# Patient Record
Sex: MALE | HISPANIC OR LATINO | Employment: FULL TIME | URBAN - METROPOLITAN AREA
[De-identification: names, ages, dates, MRNs, and addresses within clinical notes are randomized per-mention and may not be internally consistent; named-entity substitution may affect disease eponyms.]

---

## 2017-04-25 ENCOUNTER — TRANSCRIBE ORDERS (OUTPATIENT)
Dept: URGENT CARE | Facility: CLINIC | Age: 52
End: 2017-04-25

## 2017-04-25 DIAGNOSIS — S33.6XXA SACROILIAC (LIGAMENT) SPRAIN, INITIAL ENCOUNTER: ICD-10-CM

## 2017-04-25 DIAGNOSIS — S76.011A STRAIN OF HIP, RIGHT, INITIAL ENCOUNTER: Primary | ICD-10-CM

## 2017-04-27 ENCOUNTER — TREATMENT (OUTPATIENT)
Dept: PHYSICAL THERAPY | Facility: CLINIC | Age: 52
End: 2017-04-27

## 2017-04-27 DIAGNOSIS — S76.011D HIP STRAIN, RIGHT, SUBSEQUENT ENCOUNTER: Primary | ICD-10-CM

## 2017-04-27 PROCEDURE — 97110 THERAPEUTIC EXERCISES: CPT | Performed by: PHYSICAL THERAPIST

## 2017-04-27 PROCEDURE — 97001 PR PHYS THERAPY EVALUATION: CPT | Performed by: PHYSICAL THERAPIST

## 2017-04-27 PROCEDURE — 97014 ELECTRIC STIMULATION THERAPY: CPT | Performed by: PHYSICAL THERAPIST

## 2017-04-27 NOTE — PROGRESS NOTES
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    Pain  Current pain ratin  Location: right hip  Relieving factors: ice and medications  Aggravating factors: movement             Objective     Observations     Additional Observation Details  Antalgic gait pattern    Tenderness     Additional Tenderness Details  TTP to right greater trochanter and glut region.    Active Range of Motion     Right Hip   Flexion: 60 degrees   Abduction: 65 degrees   Adduction: WFL    Strength/Myotome Testing     Right Hip   Planes of Motion   Flexion: 4  Abduction: 5  Adduction: 5    Additional Strength Details  Right Knee Extension 4-/5    Tests     Right Hip   Positive LIAT.   SLR: Positive.          Assessment & Plan     Assessment  Impairments: abnormal gait, abnormal or restricted ROM, activity intolerance, impaired physical strength, lacks appropriate home exercise program and pain with function  Assessment details: Patient presents with c/o pain, TTP, limited AROM, decreased strength, positive special testing and an antalgic gait pattern which is limiting his ability to perform full job duties.   Barriers to therapy: language  Prognosis details: STG's to be met by 2 weeks  1)  Independent with HEP  2)  Decrease pain by 50% or more  3)  AROM WNL for right hip  4)  Normal gait pattern    LTG's to be met by 4 weeks  1)  Independent with HEP progression  2)  Decrease pain by 75% or more  3)  Increase strength for right hip and knee to 4+/5  4)  Negative special testing  5)  No TTP to right hip region  6)  Patient to lift floor to waist up to 50 lbs      Plan  Therapy options: will be seen for skilled physical therapy services  Planned modality interventions: ultrasound, iontophoresis, TENS, cryotherapy and thermotherapy  (hydrocollator packs)  Planned therapy interventions: strengthening, stretching, therapeutic activities, home exercise program and gait training  Frequency: 3x week  Duration in weeks: 8  Treatment plan discussed with: patient        Manual Therapy:    0     mins  09602;  Therapeutic Exercise:    14     mins  12152;     Neuromuscular Shantanu:    0    mins  38829;    Therapeutic Activity:     0     mins  21280;     Gait Trainin     mins  19217;     Ultrasound:     0     mins  36565;    Work Hardening           0      mins 61329  Iontophoresis               0   mins 16233    Timed Treatment:   14   mins   Total Treatment:     52   mins    PT SIGNATURE: Zuhair Pereyra, PT   DATE TREATMENT INITIATED: 2017    Initial Certification  Certification Period: 2017  I certify that the therapy services are furnished while this patient is under my care.  The services outlined above are required by this patient, and will be reviewed every 90 days.     PHYSICIAN: OTIS Evangelista      DATE:     Please sign and return via fax to [phone].. Thank you, [facility].

## 2017-04-28 ENCOUNTER — TREATMENT (OUTPATIENT)
Dept: PHYSICAL THERAPY | Facility: CLINIC | Age: 52
End: 2017-04-28

## 2017-04-28 DIAGNOSIS — S76.011D HIP STRAIN, RIGHT, SUBSEQUENT ENCOUNTER: Primary | ICD-10-CM

## 2017-04-28 PROCEDURE — 97014 ELECTRIC STIMULATION THERAPY: CPT | Performed by: PHYSICAL THERAPIST

## 2017-04-28 PROCEDURE — 97110 THERAPEUTIC EXERCISES: CPT | Performed by: PHYSICAL THERAPIST

## 2017-04-28 NOTE — PROGRESS NOTES
Physical Therapy Daily Progress Note    Time In 115  Time Out 159    Brandon Schwab reports: that the hip feels the same.    Subjective     Objective   See Exercise, Manual, and Modality Logs for complete treatment.       Assessment/Plan  No significant change thus far with PT, but this is only his second visit.  Patient performed the exercise routine without significant c/o pain in the right hip.                    Manual Therapy:    0     mins  60409;  Therapeutic Exercise:    26     mins  52648;     Neuromuscular Shantanu:    0    mins  69769;    Therapeutic Activity:     0     mins  50386;     Gait Trainin     mins  54668;     Ultrasound:     0     mins  06887;    Work Hardening           0      mins 94085  Iontophoresis               0   mins 56206    Timed Treatment:   26   mins   Total Treatment:     44   mins    Zuhair Pereyra, PT  Physical Therapist

## 2017-05-01 ENCOUNTER — TREATMENT (OUTPATIENT)
Dept: PHYSICAL THERAPY | Facility: CLINIC | Age: 52
End: 2017-05-01

## 2017-05-01 DIAGNOSIS — S76.011D HIP STRAIN, RIGHT, SUBSEQUENT ENCOUNTER: Primary | ICD-10-CM

## 2017-05-01 PROCEDURE — 97014 ELECTRIC STIMULATION THERAPY: CPT | Performed by: PHYSICAL THERAPIST

## 2017-05-01 PROCEDURE — 97110 THERAPEUTIC EXERCISES: CPT | Performed by: PHYSICAL THERAPIST

## 2017-05-19 ENCOUNTER — TREATMENT (OUTPATIENT)
Dept: PHYSICAL THERAPY | Facility: CLINIC | Age: 52
End: 2017-05-19

## 2017-05-19 DIAGNOSIS — S76.011D HIP STRAIN, RIGHT, SUBSEQUENT ENCOUNTER: Primary | ICD-10-CM

## 2017-05-19 PROCEDURE — 97110 THERAPEUTIC EXERCISES: CPT | Performed by: PHYSICAL THERAPIST

## 2017-05-19 PROCEDURE — 97014 ELECTRIC STIMULATION THERAPY: CPT | Performed by: PHYSICAL THERAPIST

## 2017-05-23 ENCOUNTER — TREATMENT (OUTPATIENT)
Dept: PHYSICAL THERAPY | Facility: CLINIC | Age: 52
End: 2017-05-23

## 2017-05-23 DIAGNOSIS — S76.011D HIP STRAIN, RIGHT, SUBSEQUENT ENCOUNTER: Primary | ICD-10-CM

## 2017-05-23 PROCEDURE — 97110 THERAPEUTIC EXERCISES: CPT | Performed by: PHYSICAL THERAPIST

## 2017-05-23 PROCEDURE — 97014 ELECTRIC STIMULATION THERAPY: CPT | Performed by: PHYSICAL THERAPIST

## 2017-05-25 ENCOUNTER — TREATMENT (OUTPATIENT)
Dept: PHYSICAL THERAPY | Facility: CLINIC | Age: 52
End: 2017-05-25

## 2017-05-25 DIAGNOSIS — S76.011D HIP STRAIN, RIGHT, SUBSEQUENT ENCOUNTER: Primary | ICD-10-CM

## 2017-05-25 PROCEDURE — 97014 ELECTRIC STIMULATION THERAPY: CPT | Performed by: PHYSICAL THERAPIST

## 2017-05-25 PROCEDURE — 97110 THERAPEUTIC EXERCISES: CPT | Performed by: PHYSICAL THERAPIST

## 2017-05-30 ENCOUNTER — TREATMENT (OUTPATIENT)
Dept: PHYSICAL THERAPY | Facility: CLINIC | Age: 52
End: 2017-05-30

## 2017-05-30 DIAGNOSIS — S76.011D HIP STRAIN, RIGHT, SUBSEQUENT ENCOUNTER: Primary | ICD-10-CM

## 2017-05-30 PROCEDURE — 97140 MANUAL THERAPY 1/> REGIONS: CPT | Performed by: PHYSICAL THERAPIST

## 2017-05-30 PROCEDURE — 97110 THERAPEUTIC EXERCISES: CPT | Performed by: PHYSICAL THERAPIST

## 2017-05-30 PROCEDURE — 97014 ELECTRIC STIMULATION THERAPY: CPT | Performed by: PHYSICAL THERAPIST

## 2017-06-21 ENCOUNTER — DOCUMENTATION (OUTPATIENT)
Dept: PHYSICAL THERAPY | Facility: CLINIC | Age: 52
End: 2017-06-21

## 2017-06-21 NOTE — PROGRESS NOTES
Discharge Summary  Discharge Summary from Physical Therapy Report      Dates  PT visit: 4/27 to 5/30/17  Number of Visits: 7     Discharge Status of Patient: See MD Note dated 5/30/17    Goals: Partially Met    Discharge Plan: Refer to other services (specify):likely for MRI or to ortho.    Comments Patient did not return to PT.    Date of Discharge 6/21/17        Zuhair Pereyra, PT  Physical Therapist

## 2018-05-15 ENCOUNTER — APPOINTMENT (OUTPATIENT)
Dept: GENERAL RADIOLOGY | Facility: HOSPITAL | Age: 53
End: 2018-05-15

## 2018-05-15 PROCEDURE — 73650 X-RAY EXAM OF HEEL: CPT | Performed by: GENERAL PRACTICE

## 2018-05-15 PROCEDURE — 73630 X-RAY EXAM OF FOOT: CPT | Performed by: FAMILY MEDICINE

## 2018-05-15 PROCEDURE — 73650 X-RAY EXAM OF HEEL: CPT | Performed by: FAMILY MEDICINE

## 2018-05-15 PROCEDURE — 73630 X-RAY EXAM OF FOOT: CPT | Performed by: GENERAL PRACTICE
